# Patient Record
Sex: FEMALE | Race: WHITE
[De-identification: names, ages, dates, MRNs, and addresses within clinical notes are randomized per-mention and may not be internally consistent; named-entity substitution may affect disease eponyms.]

---

## 2020-03-06 ENCOUNTER — HOSPITAL ENCOUNTER (INPATIENT)
Dept: HOSPITAL 25 - MCHOBOUT | Age: 40
LOS: 2 days | Discharge: HOME | End: 2020-03-08
Attending: MIDWIFE | Admitting: MIDWIFE
Payer: COMMERCIAL

## 2020-03-06 DIAGNOSIS — Z3A.39: ICD-10-CM

## 2020-03-06 DIAGNOSIS — D64.9: ICD-10-CM

## 2020-03-06 LAB
ALBUMIN SERPL BCG-MCNC: 3.2 G/DL (ref 3.2–5.2)
ALBUMIN/GLOB SERPL: 1 {RATIO} (ref 1–3)
ALP SERPL-CCNC: 185 U/L (ref 34–104)
ALT SERPL W P-5'-P-CCNC: 9 U/L (ref 7–52)
ANION GAP SERPL CALC-SCNC: 9 MMOL/L (ref 2–11)
AST SERPL-CCNC: (no result) U/L (ref 13–39)
BASOPHILS # BLD AUTO: 0 10^3/UL (ref 0–0.2)
BUN SERPL-MCNC: 6 MG/DL (ref 6–24)
BUN/CREAT SERPL: 10.7 (ref 8–20)
CALCIUM SERPL-MCNC: 8.2 MG/DL (ref 8.6–10.3)
CHLORIDE SERPL-SCNC: 107 MMOL/L (ref 101–111)
EOSINOPHIL # BLD AUTO: 0 10^3/UL (ref 0–0.6)
GLOBULIN SER CALC-MCNC: 3.3 G/DL (ref 2–4)
GLUCOSE SERPL-MCNC: 117 MG/DL (ref 70–100)
HCO3 SERPL-SCNC: 20 MMOL/L (ref 22–32)
HCT VFR BLD AUTO: 32 % (ref 35–47)
HGB BLD-MCNC: 10.7 G/DL (ref 12–16)
LYMPHOCYTES # BLD AUTO: 1.3 10^3/UL (ref 1–4.8)
MCH RBC QN AUTO: 26 PG (ref 27–31)
MCHC RBC AUTO-ENTMCNC: 33 G/DL (ref 31–36)
MCV RBC AUTO: 79 FL (ref 80–97)
MONOCYTES # BLD AUTO: 0.4 10^3/UL (ref 0–0.8)
NEUTROPHILS # BLD AUTO: 8.2 10^3/UL (ref 1.5–7.7)
NRBC # BLD AUTO: 0 10^3/UL
NRBC BLD QL AUTO: 0
PLATELET # BLD AUTO: 321 10^3/UL (ref 150–450)
POTASSIUM SERPL-SCNC: (no result) MMOL/L (ref 3.5–5)
PROT SERPL-MCNC: 6.5 G/DL (ref 6.4–8.9)
RBC # BLD AUTO: 4.11 10^6 /UL (ref 3.7–4.87)
RBC UR QL AUTO: (no result)
SODIUM SERPL-SCNC: 136 MMOL/L (ref 135–145)
URATE SERPL-MCNC: 4.4 MG/DL (ref 2.3–6.6)
WBC # BLD AUTO: 10 10^3/UL (ref 3.5–10.8)
WBC UR QL AUTO: (no result)

## 2020-03-06 PROCEDURE — G0480 DRUG TEST DEF 1-7 CLASSES: HCPCS

## 2020-03-06 PROCEDURE — 86850 RBC ANTIBODY SCREEN: CPT

## 2020-03-06 PROCEDURE — 81015 MICROSCOPIC EXAM OF URINE: CPT

## 2020-03-06 PROCEDURE — 3E033VJ INTRODUCTION OF OTHER HORMONE INTO PERIPHERAL VEIN, PERCUTANEOUS APPROACH: ICD-10-PCS | Performed by: MIDWIFE

## 2020-03-06 PROCEDURE — 85025 COMPLETE CBC W/AUTO DIFF WBC: CPT

## 2020-03-06 PROCEDURE — 80307 DRUG TEST PRSMV CHEM ANLYZR: CPT

## 2020-03-06 PROCEDURE — 36415 COLL VENOUS BLD VENIPUNCTURE: CPT

## 2020-03-06 PROCEDURE — 86901 BLOOD TYPING SEROLOGIC RH(D): CPT

## 2020-03-06 PROCEDURE — 80053 COMPREHEN METABOLIC PANEL: CPT

## 2020-03-06 PROCEDURE — 84550 ASSAY OF BLOOD/URIC ACID: CPT

## 2020-03-06 PROCEDURE — 0HQ9XZZ REPAIR PERINEUM SKIN, EXTERNAL APPROACH: ICD-10-PCS | Performed by: MIDWIFE

## 2020-03-06 PROCEDURE — 86900 BLOOD TYPING SEROLOGIC ABO: CPT

## 2020-03-06 PROCEDURE — 81003 URINALYSIS AUTO W/O SCOPE: CPT

## 2020-03-06 PROCEDURE — 84112 EVAL AMNIOTIC FLUID PROTEIN: CPT

## 2020-03-06 PROCEDURE — S0191 MISOPROSTOL, ORAL, 200 MCG: HCPCS

## 2020-03-06 PROCEDURE — 87086 URINE CULTURE/COLONY COUNT: CPT

## 2020-03-06 RX ADMIN — WITCH HAZEL PRN PAD: 5 CLOTH TOPICAL at 21:16

## 2020-03-06 RX ADMIN — DOCUSATE SODIUM SCH MG: 100 CAPSULE, LIQUID FILLED ORAL at 21:16

## 2020-03-06 RX ADMIN — DIBUCAINE PRN APPLIC: 1 OINTMENT TOPICAL at 21:16

## 2020-03-06 RX ADMIN — IBUPROFEN PRN MG: 400 TABLET ORAL at 21:15

## 2020-03-06 NOTE — HP
General Information





- Reason for Visit





Pt sent in from office for cervical ripening/ IOL for gestational HTN vs 

preeclampsia at 39 0/7 weeks gestation. 





- General Information


Maternal Age: 39


Grav: 2


Para: 1


SAB: 0


IEA: 0





Estimated Due Date: 20


Determined By: LMP


Gestational Age in Weeks/Days: 39 0/7


Maternal Blood Type and Rh: B Positive





- Results this Pregnancy


Serology/RPR Result: Non-Reactive


Rubella Result: Immune


HBsAg Result: Negative


HIV Result: Negative


GBS Culture Result: Negative





Past Medical History


Delivery History: Hx Uncomplicated Vaginal Delivery


Pertinent Past Medical History: Non-Contributory


Pertinent Past Surgical History: See  Records - Foot surgery 


Pertinent Family History: See  Records - Pt's mother delivered her at 

43 weeks, weighed 11#





- Antepartal Records


Antepartal Records: Reviewed, Pregnancy Complicated by: - AMA, IVF pregnancy, 

gestational HTN vs preeclampsia





Review of Systems


Constitutional: Comfortable


CV Complaint: No


Respiratory: Shortness of Breath: No


Gastrointestinal: No Nausea/Vomiting, Normal Bowel Movement


Genitourinary: No Dysuria, No Bleeding, No Leaking Fluid


Musculoskeletal: No Complaint, No Epigastric Pain


Neurological: No Visual Changes, Headache


Fetal Movement: Normal





Exam





T-97.7, P-87, R-18, BP- 143/91, O2-98%





- Measurements


Height: 5 ft 4 in


Weight: 96.615 kg


Body Mass Index (BMI): 36.6


Pre-Pregnancy Weight: 81.647 kg





- Exam


Breast: Breast Exam Deferred


CVA: No CVA Tenderness


Extremities: Edema - mild trace pedal edema


Heart: Normal Rhythm/Heart Sounds


HEENT: No Significant Findings


Lungs: Clear Bilaterally


Rectal: Rectal Exam Deferred


Reflexes: DTR 2+


Thyroid: No Thyromegaly





- Abdominal Exam


Abdomen Exam: Non-Tender, Fundal Height Consistent with Dates





- Ultrasound/Biophysical Profile


Ultrasound Status: Not Done





Targeted Exam Findings


See L&D Outpatient Visit Provider Note for Findings: N/A


Estimated Fetal Weight: 8#


Cervical Exam: 1cm


Effacement: Thick


Station: High


Presenting Part: Vertex


Membrane Status: Intact


Bleeding/Discharge: None





EFM Findings





- External Fetal Monitor Findings


Baseline Fetal Heart Rate: 130


External Fetal Monitor Findings: Accelerations Present, No Pattern of Variable 

or Late Decelerations, Variability Moderate, Baseline Stable


Contractions: None





Assessment/Plan





- Assessment





39 year old  at 39 0/7 weeks gestation, with elevated BPs, gestational HTN 

vs PEC. No evidence of fetal acidemia. Cervix currently unfavorable. 





- Obstetrical Risk Factors


Obstetrical Risk Factors: Gestational Hypertension





- Plan


Plan: Cervical Ripening, Admit - Anticipate Vaginal Delivery





- Date/Time of Admission


Date of Admission: 20


Time of Admission: 13:45

## 2020-03-06 NOTE — PROCNOTE
[facility] OB: Delivery Note





- Delivery


  ** A


Date of Birth: [birth year]


Time of Birth: 19:45


 Sex: Male


[place] Weight at Birth: 3.525 kg


Apgar Score 1 Minute: 9


Apgar Score 5 Minutes: 9


Gestational Age in Weeks and Days at Delivery: 39 Weeks and 0 Days


Delivery Method: Spontaneous Vaginal


Labor: Induced


Did Patient attempt ?: N/A, No Previous 


Amniotic Fluid: Clear


Estimated Blood Loss: 300


Anesthesia/Analgesia: Nitrous-Labor


Delivered By: Citlali Alfred





- Nursery


Level of Nursery: Regular/Bedside





- Perineum


Perineal Injury: 1st Degree


Perineal Repair: By Delivering Practioner





- Events


Delivery Events of Note: Pitocin Only After Delivery, Shoulder Dystocia - 

resolved with Simona, suprapubic, and adduction of the anterior shoulder


Delivery Events of Note Comment: 30 second shoulder dystocia. Simona and 

superpubic pressure performed.





- Additional Delivery Notes


Additional Delivery Notes: 





Pt admitted to L&D for cervical ripening and IOL for elevated BP in office. 

Negative proteinuria and labs WNL so pt diagnosed with gestational HTN. 

Cervical ripening initiated with single dose of vaginal misoprostol. About 2 

hours after it was placed pt began to experience intense ctx along with SROM to 

clear fluid. Pt soon requested an epidural. By the time anesthesiologist 

arrived pt was rapidly dilating so decision made to attempt intrathecal. 

Despite multiple attempts anesthesiologist was unable to perform intrathecal. 

At this point pt was clearly pushing so decision made to attempt delivery. Pt 

provided with nitrous oxide. Pt pushed spontaneously with good effort and rapid 

fetal descent. Pt coached through slow controlled delivery of the head. Gentle 

traction did not deliver fetal shoulders so pt placed in Simona position. 

This did not result in delivery so emergency bell activated and suprapubic 

pressure initiated. Anterior shoulder adducted gently and shoulders released 

and baby delivered. Total time from head to body less than one minute. Infant 

cried spontaneously and was placed on maternal abdomen, dried and stimulated. 

Good tone and HR> 100 noted. After gush of blood suggested placental separation 

had begun cord clamped x2 and cut. Placenta soon delivered with gentle cord 

traction. Placenta noted to be heart shaped with some calcification, intact. 

Pitocin initiated and 250 cc/ hr. Bleeding minimal. Inspection of the perineum 

revealed small first degree laceration. Sutured using 3-0 vicryl absorbable 

suture resulting in good hemostasis and tissue approximation. Mother and infant 

stable at this time, anticipate normal postpartum course.

## 2020-03-06 NOTE — PN
Progress Note





- Progress Note


Date of Service: 20


SOAP: 


Subjective:


Pt comfortable, a couple ctx noted on the monitor but pt was not aware of them. 





Objective:


Cervix at last exam: 1 cm/ 50%/ -2/ vtx. 


FHR: Baseline 135/ moderate variability/ + accels/ no decels


UCs: mild, irregular


Negative proteinuria


PEC labs WNL





Assessment:


39 year old  at 39 0/7 weeks gestation with gestational hypertension. No 

evidence of fetal acidemia. 





Plan:


Vaginal misoprostol placed at 1500. Will recheck after 4 hours or as needed.

## 2020-03-06 NOTE — PN
Progress Note





- Progress Note


Date of Service: 03/06/20


SOAP: 


Subjective:


Pt leann frequently, very uncomfortable. Feels ready for epidural. 


Reports leaking clear fluid





Objective:


ROM plus positive


FHR: Baseline 140/ moderate variability/ + accels/ no decels


UCs: 2-3 minutes


/94


Cervix: 3cm/ 90%/ 0 station/ vtx





Assessment:


Pt in active labor. No evidence of fetal acidemia. BP elevated, likely due to 

pain, but not critically so. 





Plan:


Anesthesia notified to provide labor epidural. Will continue to monitor progress

, BPs.

## 2020-03-06 NOTE — PN
Progress Note





- Progress Note


Date of Service: 03/06/20


Note: 





Discussed options for cervical ripening with pt. Decision made to do one dose 

of vaginal misoprostol. Will reassess after 4 hours or as needed. CBC 

unremarkable, other labs pending.

## 2020-03-07 VITALS — DIASTOLIC BLOOD PRESSURE: 83 MMHG | SYSTOLIC BLOOD PRESSURE: 136 MMHG

## 2020-03-07 LAB
AST SERPL-CCNC: 14 U/L (ref 13–39)
BASOPHILS # BLD AUTO: 0.1 10^3/UL (ref 0–0.2)
EOSINOPHIL # BLD AUTO: 0 10^3/UL (ref 0–0.6)
HCT VFR BLD AUTO: 29 % (ref 35–47)
HGB BLD-MCNC: 9.2 G/DL (ref 12–16)
LYMPHOCYTES # BLD AUTO: 1.9 10^3/UL (ref 1–4.8)
MCH RBC QN AUTO: 25 PG (ref 27–31)
MCHC RBC AUTO-ENTMCNC: 32 G/DL (ref 31–36)
MCV RBC AUTO: 79 FL (ref 80–97)
MONOCYTES # BLD AUTO: 0.7 10^3/UL (ref 0–0.8)
NEUTROPHILS # BLD AUTO: 12.5 10^3/UL (ref 1.5–7.7)
NRBC # BLD AUTO: 0 10^3/UL
NRBC BLD QL AUTO: 0
PLATELET # BLD AUTO: 270 10^3/UL (ref 150–450)
POTASSIUM SERPL-SCNC: 3.7 MMOL/L (ref 3.5–5)
RBC # BLD AUTO: 3.65 10^6 /UL (ref 3.7–4.87)
WBC # BLD AUTO: 15.1 10^3/UL (ref 3.5–10.8)

## 2020-03-07 RX ADMIN — Medication SCH MG: at 08:11

## 2020-03-07 RX ADMIN — IBUPROFEN PRN MG: 400 TABLET ORAL at 23:56

## 2020-03-07 RX ADMIN — DOCUSATE SODIUM SCH MG: 100 CAPSULE, LIQUID FILLED ORAL at 20:54

## 2020-03-07 RX ADMIN — IBUPROFEN PRN MG: 400 TABLET ORAL at 17:21

## 2020-03-07 RX ADMIN — DOCUSATE SODIUM SCH MG: 100 CAPSULE, LIQUID FILLED ORAL at 14:22

## 2020-03-07 RX ADMIN — IBUPROFEN PRN MG: 400 TABLET ORAL at 10:31

## 2020-03-07 RX ADMIN — Medication SCH MG: at 20:54

## 2020-03-07 RX ADMIN — WITCH HAZEL PRN PAD: 5 CLOTH TOPICAL at 17:24

## 2020-03-07 RX ADMIN — DIBUCAINE PRN APPLIC: 1 OINTMENT TOPICAL at 17:24

## 2020-03-07 RX ADMIN — IBUPROFEN PRN MG: 400 TABLET ORAL at 04:15

## 2020-03-07 RX ADMIN — DOCUSATE SODIUM SCH MG: 100 CAPSULE, LIQUID FILLED ORAL at 08:12

## 2020-03-08 RX ADMIN — DOCUSATE SODIUM SCH MG: 100 CAPSULE, LIQUID FILLED ORAL at 12:39

## 2020-03-08 RX ADMIN — IBUPROFEN PRN MG: 400 TABLET ORAL at 06:06

## 2020-03-08 RX ADMIN — Medication SCH MG: at 08:20

## 2020-03-08 RX ADMIN — IBUPROFEN PRN MG: 400 TABLET ORAL at 12:39

## 2020-03-08 RX ADMIN — DOCUSATE SODIUM SCH MG: 100 CAPSULE, LIQUID FILLED ORAL at 08:20
